# Patient Record
Sex: MALE | Race: WHITE | NOT HISPANIC OR LATINO | URBAN - METROPOLITAN AREA
[De-identification: names, ages, dates, MRNs, and addresses within clinical notes are randomized per-mention and may not be internally consistent; named-entity substitution may affect disease eponyms.]

---

## 2019-01-21 ENCOUNTER — EMERGENCY (EMERGENCY)
Facility: HOSPITAL | Age: 54
LOS: 1 days | Discharge: HOME | End: 2019-01-21
Attending: EMERGENCY MEDICINE
Payer: COMMERCIAL

## 2019-01-21 VITALS
DIASTOLIC BLOOD PRESSURE: 82 MMHG | RESPIRATION RATE: 20 BRPM | SYSTOLIC BLOOD PRESSURE: 146 MMHG | OXYGEN SATURATION: 98 % | HEART RATE: 64 BPM | WEIGHT: 309.97 LBS | HEIGHT: 70 IN | TEMPERATURE: 99 F

## 2019-01-21 VITALS
OXYGEN SATURATION: 95 % | TEMPERATURE: 98 F | RESPIRATION RATE: 20 BRPM | HEART RATE: 58 BPM | SYSTOLIC BLOOD PRESSURE: 153 MMHG | DIASTOLIC BLOOD PRESSURE: 80 MMHG

## 2019-01-21 PROCEDURE — 99283 EMERGENCY DEPT VISIT LOW MDM: CPT

## 2019-01-21 RX ORDER — IBUPROFEN 200 MG
1 TABLET ORAL
Qty: 28 | Refills: 0 | OUTPATIENT
Start: 2019-01-21 | End: 2019-01-27

## 2019-01-21 RX ADMIN — Medication 1 TABLET(S): at 15:30

## 2019-01-21 NOTE — ED PROVIDER NOTE - ENMT, MLM
Airway patent, Nasal mucosa clear. Mouth with normal mucosa. Throat has no vesicles, no oropharyngeal exudates and uvula is midline.  # 18 tooth s/p root canal.    + significant swelling over left side of face that was tender to palpation.  No palpable abscess

## 2019-01-21 NOTE — ED PROVIDER NOTE - NSFOLLOWUPINSTRUCTIONS_ED_ALL_ED_FT
1- Augmentin 875 mg every 12 hours for 10 days  2- Motrin 600 mg every 6 hours for pain and swelling  3- Follow up with your dentist this week  4- Any worsening swelling, pain , redness, fevers, or any other complaints come back to the ER immediately

## 2019-01-21 NOTE — ED PROVIDER NOTE - ATTENDING CONTRIBUTION TO CARE
I performed a history and physical exam of the patient and discussed their management with the Advanced Care Practitioner. I reviewed the ACP's note and agree with the documented findings and plan of care, except if noted below. My medical decision making and observations are found below.    54 yo male presents with left sided facial swelling after root canal with drainage and pus. No fevers. +left lower gum swelling, no drainage appreciated on exam. No elevation of tongue or redness to face. Unlikely early ludwigs angina. Will consult dental to eval for abscess, likely abx and dc home with dental follow up.

## 2019-01-21 NOTE — ED PROVIDER NOTE - NSFOLLOWUPCLINICS_GEN_ALL_ED_FT
[facility]  Dental  [address] [place] [id number]  Phone: [phone]  Fax:   Follow Up Time:

## 2019-01-21 NOTE — ED ADULT NURSE NOTE - NSIMPLEMENTINTERV_GEN_ALL_ED
Implemented All Universal Safety Interventions:  [place] to call system. Call bell, personal items and telephone within reach. Instruct patient to call for assistance. Room bathroom lighting operational. Non-slip footwear when patient is off stretcher. Physically safe environment: no spills, clutter or unnecessary equipment. Stretcher in lowest position, wheels locked, appropriate side rails in place.

## 2019-01-21 NOTE — CONSULT NOTE ADULT - SUBJECTIVE AND OBJECTIVE BOX
Patient is a 53y old  Male who presents with a chief complaint of dental swelling    HPI: Pt was seen by outpatient dentist for rootcanal of tooth #18. Root canal completed and patient developed swelling around site of tooth #18. Drainage and pus is present.       PAST MEDICAL & SURGICAL HISTORY:  Asthma, Chronic  Obstructive Sleep Apnea  GERD (Gastroesophageal Reflux Disease)  HTN (Hypertension)      Allergies    No Known Allergies    Vital Signs Last 24 Hrs  T(C): 36.8 (21 Jan 2019 15:34), Max: 37.1 (21 Jan 2019 14:18)  T(F): 98.3 (21 Jan 2019 15:34), Max: 98.7 (21 Jan 2019 14:18)  HR: 58 (21 Jan 2019 15:34) (58 - 64)  BP: 153/80 (21 Jan 2019 15:34) (146/82 - 153/80)  BP(mean): --  RR: 20 (21 Jan 2019 15:34) (20 - 20)  SpO2: 95% (21 Jan 2019 15:34) (95% - 98%)    EOE:  TMJ ( -  ) clicks                    (  -  ) pops                    (  -  ) crepitus             Mandible <<FROM>>             Facial bones and MOM <<grossly intact>>             ( -  ) trismus             ( -  ) LAD             ( +  ) swelling             ( - ) asymmetry             ( - ) palpation             ( - ) SOB             ( - ) dysphagia             ( - ) LOC    IOE:  permanent grossly intact           hard/soft palate:  ( -  ) palatal torus           tongue/FOM <<WNL>>           labial/buccal mucosa <<WNL>>           ( -  ) percussion           ( + ) palpation           ( + ) swelling         Radiographs: 1 Panoramic radiograph taken and reviewed    RADIOLOGY & ADDITIONAL STUDIES: PAN reveals no periapical radiolucency associated with tooth #18. Root canal completed on tooth #18.     ASSESSMENT: Pt presents with buccal swelling around tooth #18. No fluctuance present. Drainage and pus appreciated. At this time, no treatment indicated since site is draining well. Recommended patient to massage area to help area drain better.       RECOMMENDATIONS:   1) Begin and finish antibiotic regimen prescribed by ED team.   2) Dental F/U with outpatient dentist for comprehensive dental care.   3) If any difficulty swallowing/breathing, fever occur, page dental.     Bentley Palma, pager #[phone]

## 2019-01-21 NOTE — ED PROVIDER NOTE - OBJECTIVE STATEMENT
53 y.o. male history of HTN and asthma coming in with left sided facial swelling and pain after a root canal 4 days ago.  Initially was doing well, however 2 days ago started with swelling and pain.  Yesterday noticed purulent drainage from the area.  Taking Motrin and Tylenol without relief.  No difficulty swallowing, no fevers, pain worse with mastication, nothing makes it better.  Called his dentist today that recommended he come to the ER to be evaluated.

## 2019-01-21 NOTE — ED ADULT NURSE NOTE - OBJECTIVE STATEMENT
Pt c/o pain and discharge s/p root canal 1/17.  Reports that pain became worse yesterday and pussy discharge also started, noticed some swelling in his mouth around the site of root canal (lower left).  No fevers.  Called dental office who instructed him to come to ER.

## 2019-01-21 NOTE — ED ADULT NURSE NOTE - PMH
Asthma, Chronic    GERD (Gastroesophageal Reflux Disease)    HTN (Hypertension)    Obstructive Sleep Apnea

## 2022-10-17 NOTE — ED PROVIDER NOTE - CONTACT TIME
Vaccine Information Statement(s) or the Emergency Use Authorization was given today. This has been reviewed, questions answered, and verbal consent given by Patient for injection(s) and administration of Influenza (Inactivated).      Patient tolerated without incident. See immunization grid for documentation.         21-Jan-2019 14:37